# Patient Record
Sex: FEMALE | Race: WHITE | NOT HISPANIC OR LATINO | URBAN - METROPOLITAN AREA
[De-identification: names, ages, dates, MRNs, and addresses within clinical notes are randomized per-mention and may not be internally consistent; named-entity substitution may affect disease eponyms.]

---

## 2017-03-28 ENCOUNTER — IMPORTED ENCOUNTER (OUTPATIENT)
Dept: URBAN - METROPOLITAN AREA CLINIC 50 | Facility: CLINIC | Age: 42
End: 2017-03-28

## 2019-11-13 ENCOUNTER — IMPORTED ENCOUNTER (OUTPATIENT)
Dept: URBAN - METROPOLITAN AREA CLINIC 50 | Facility: CLINIC | Age: 44
End: 2019-11-13

## 2021-04-23 ASSESSMENT — VISUAL ACUITY
OD_CC: J1+
OS_CC: J1+
OS_SC: 20/25-
OD_SC: 20/20-
OS_SC: 20/25-
OD_SC: 20/20

## 2021-04-23 ASSESSMENT — TONOMETRY
OD_IOP_MMHG: 15
OS_IOP_MMHG: 15
OS_IOP_MMHG: 16
OD_IOP_MMHG: 17

## 2021-11-29 ENCOUNTER — PREPPED CHART (OUTPATIENT)
Dept: URBAN - METROPOLITAN AREA CLINIC 53 | Facility: CLINIC | Age: 46
End: 2021-11-29

## 2021-12-08 ENCOUNTER — COMPREHENSIVE EXAM (OUTPATIENT)
Dept: URBAN - METROPOLITAN AREA CLINIC 53 | Facility: CLINIC | Age: 46
End: 2021-12-08

## 2021-12-08 DIAGNOSIS — H16.223: ICD-10-CM

## 2021-12-08 DIAGNOSIS — Z01.01: ICD-10-CM

## 2021-12-08 PROCEDURE — 92014 COMPRE OPH EXAM EST PT 1/>: CPT

## 2021-12-08 PROCEDURE — 92015 DETERMINE REFRACTIVE STATE: CPT

## 2021-12-08 ASSESSMENT — VISUAL ACUITY
OD_SC: J1+
OD_SC: 20/20
OS_SC: J1
OU_SC: J1+
OS_SC: 20/20-
OU_SC: 20/20

## 2021-12-08 ASSESSMENT — TONOMETRY
OS_IOP_MMHG: 17
OD_IOP_MMHG: 17

## 2021-12-08 NOTE — PATIENT DISCUSSION
Drop regiment discussed with patient. Patient to continue PF tears OU 2-3x/day or as needed. Start warm compresses OU BID. Advised to do blinking sequence.

## 2024-08-12 ENCOUNTER — COMPREHENSIVE EXAM (OUTPATIENT)
Dept: URBAN - METROPOLITAN AREA CLINIC 53 | Facility: CLINIC | Age: 49
End: 2024-08-12

## 2024-08-12 DIAGNOSIS — H52.203: ICD-10-CM

## 2024-08-12 DIAGNOSIS — Z01.01: ICD-10-CM

## 2024-08-12 PROCEDURE — 92014 COMPRE OPH EXAM EST PT 1/>: CPT

## 2024-08-12 PROCEDURE — 92015 DETERMINE REFRACTIVE STATE: CPT

## 2024-08-12 ASSESSMENT — TONOMETRY
OS_IOP_MMHG: 21
OD_IOP_MMHG: 21

## 2024-08-12 ASSESSMENT — VISUAL ACUITY
OU_SC: J1
OS_SC: 20/30
OD_SC: 20/25
OS_PH: 20/25